# Patient Record
Sex: MALE | Race: WHITE | NOT HISPANIC OR LATINO | URBAN - METROPOLITAN AREA
[De-identification: names, ages, dates, MRNs, and addresses within clinical notes are randomized per-mention and may not be internally consistent; named-entity substitution may affect disease eponyms.]

---

## 2018-08-28 ENCOUNTER — OFFICE VISIT CONVERTED (OUTPATIENT)
Dept: ORTHOPEDIC SURGERY | Facility: CLINIC | Age: 83
End: 2018-08-28
Attending: ORTHOPAEDIC SURGERY

## 2018-11-29 ENCOUNTER — OFFICE VISIT CONVERTED (OUTPATIENT)
Dept: ORTHOPEDIC SURGERY | Facility: CLINIC | Age: 83
End: 2018-11-29
Attending: ORTHOPAEDIC SURGERY

## 2018-12-06 ENCOUNTER — OFFICE VISIT CONVERTED (OUTPATIENT)
Dept: ORTHOPEDIC SURGERY | Facility: CLINIC | Age: 83
End: 2018-12-06
Attending: ORTHOPAEDIC SURGERY

## 2019-02-22 ENCOUNTER — HOSPITAL ENCOUNTER (OUTPATIENT)
Dept: SLEEP MEDICINE | Facility: HOSPITAL | Age: 84
Discharge: HOME | End: 2019-02-22

## 2019-02-25 ENCOUNTER — HOSPITAL ENCOUNTER (OUTPATIENT)
Dept: SLEEP MEDICINE | Facility: HOSPITAL | Age: 84
Discharge: HOME | End: 2019-02-25

## 2019-05-16 ENCOUNTER — HOSPITAL ENCOUNTER (OUTPATIENT)
Dept: SLEEP MEDICINE | Facility: HOSPITAL | Age: 84
Discharge: HOME | End: 2019-05-16
Attending: INTERNAL MEDICINE

## 2019-09-05 ENCOUNTER — HOSPITAL ENCOUNTER (OUTPATIENT)
Dept: SLEEP MEDICINE | Facility: HOSPITAL | Age: 84
Discharge: HOME | End: 2019-09-05
Attending: INTERNAL MEDICINE

## 2019-10-15 ENCOUNTER — HOSPITAL ENCOUNTER (OUTPATIENT)
Dept: SLEEP MEDICINE | Facility: HOSPITAL | Age: 84
Discharge: HOME | End: 2019-10-15
Attending: INTERNAL MEDICINE

## 2019-11-13 ENCOUNTER — HOSPITAL ENCOUNTER (OUTPATIENT)
Dept: GENERAL RADIOLOGY | Facility: HOSPITAL | Age: 84
Discharge: HOME | End: 2019-11-13

## 2019-12-30 ENCOUNTER — OFFICE VISIT CONVERTED (OUTPATIENT)
Dept: NEUROSURGERY | Facility: CLINIC | Age: 84
End: 2019-12-30
Attending: PHYSICIAN ASSISTANT

## 2020-01-09 ENCOUNTER — HOSPITAL ENCOUNTER (OUTPATIENT)
Dept: SLEEP MEDICINE | Facility: HOSPITAL | Age: 85
Discharge: HOME | End: 2020-01-09
Attending: INTERNAL MEDICINE

## 2020-02-07 ENCOUNTER — OFFICE VISIT CONVERTED (OUTPATIENT)
Dept: ORTHOPEDIC SURGERY | Facility: CLINIC | Age: 85
End: 2020-02-07
Attending: ORTHOPAEDIC SURGERY

## 2020-02-17 ENCOUNTER — CONVERSION ENCOUNTER (OUTPATIENT)
Dept: FAMILY MEDICINE CLINIC | Facility: CLINIC | Age: 85
End: 2020-02-17

## 2020-02-17 ENCOUNTER — OFFICE VISIT CONVERTED (OUTPATIENT)
Dept: FAMILY MEDICINE CLINIC | Facility: CLINIC | Age: 85
End: 2020-02-17
Attending: NURSE PRACTITIONER

## 2020-03-05 ENCOUNTER — HOSPITAL ENCOUNTER (OUTPATIENT)
Dept: LAB | Facility: HOSPITAL | Age: 85
Discharge: HOME | End: 2020-03-05
Attending: NURSE PRACTITIONER

## 2020-03-05 LAB
ALBUMIN SERPL-MCNC: 4 G/DL (ref 3.5–5)
ALBUMIN/GLOB SERPL: 1.6 {RATIO} (ref 1.4–2.6)
ALP SERPL-CCNC: 45 U/L (ref 56–155)
ALT SERPL-CCNC: 16 U/L (ref 10–40)
ANION GAP SERPL CALC-SCNC: 17 MMOL/L (ref 8–19)
AST SERPL-CCNC: 23 U/L (ref 15–50)
BILIRUB SERPL-MCNC: 1.27 MG/DL (ref 0.2–1.3)
BUN SERPL-MCNC: 18 MG/DL (ref 5–25)
BUN/CREAT SERPL: 14 {RATIO} (ref 6–20)
CALCIUM SERPL-MCNC: 8.8 MG/DL (ref 8.7–10.4)
CHLORIDE SERPL-SCNC: 103 MMOL/L (ref 99–111)
CHOLEST SERPL-MCNC: 151 MG/DL (ref 107–200)
CHOLEST/HDLC SERPL: 3 {RATIO} (ref 3–6)
CONV CO2: 24 MMOL/L (ref 22–32)
CONV TOTAL PROTEIN: 6.5 G/DL (ref 6.3–8.2)
CREAT UR-MCNC: 1.28 MG/DL (ref 0.7–1.2)
GFR SERPLBLD BASED ON 1.73 SQ M-ARVRAT: 50 ML/MIN/{1.73_M2}
GLOBULIN UR ELPH-MCNC: 2.5 G/DL (ref 2–3.5)
GLUCOSE SERPL-MCNC: 108 MG/DL (ref 70–99)
HDLC SERPL-MCNC: 51 MG/DL (ref 40–60)
LDLC SERPL CALC-MCNC: 86 MG/DL (ref 70–100)
OSMOLALITY SERPL CALC.SUM OF ELEC: 292 MOSM/KG (ref 273–304)
POTASSIUM SERPL-SCNC: 4.4 MMOL/L (ref 3.5–5.3)
PSA SERPL-MCNC: 2.3 NG/ML (ref 0–4)
SODIUM SERPL-SCNC: 140 MMOL/L (ref 135–147)
TRIGL SERPL-MCNC: 70 MG/DL (ref 40–150)
VLDLC SERPL-MCNC: 14 MG/DL (ref 5–37)

## 2020-03-10 ENCOUNTER — OFFICE VISIT CONVERTED (OUTPATIENT)
Dept: NEUROLOGY | Facility: CLINIC | Age: 85
End: 2020-03-10
Attending: NURSE PRACTITIONER

## 2020-03-10 ENCOUNTER — OFFICE VISIT CONVERTED (OUTPATIENT)
Dept: ORTHOPEDIC SURGERY | Facility: CLINIC | Age: 85
End: 2020-03-10
Attending: PHYSICIAN ASSISTANT

## 2020-04-14 ENCOUNTER — HOSPITAL ENCOUNTER (OUTPATIENT)
Dept: LAB | Facility: HOSPITAL | Age: 85
Discharge: HOME | End: 2020-04-14
Attending: NURSE PRACTITIONER

## 2020-04-14 LAB
EST. AVERAGE GLUCOSE BLD GHB EST-MCNC: 143 MG/DL
HBA1C MFR BLD: 6.6 % (ref 3.5–5.7)

## 2020-04-23 ENCOUNTER — OFFICE VISIT CONVERTED (OUTPATIENT)
Dept: FAMILY MEDICINE CLINIC | Facility: CLINIC | Age: 85
End: 2020-04-23
Attending: NURSE PRACTITIONER

## 2020-04-24 ENCOUNTER — HOSPITAL ENCOUNTER (OUTPATIENT)
Dept: GENERAL RADIOLOGY | Facility: HOSPITAL | Age: 85
Discharge: HOME | End: 2020-04-24
Attending: NURSE PRACTITIONER

## 2020-04-24 ENCOUNTER — HOSPITAL ENCOUNTER (OUTPATIENT)
Dept: OTHER | Facility: HOSPITAL | Age: 85
Discharge: HOME | End: 2020-04-24
Attending: NURSE PRACTITIONER

## 2020-04-24 LAB
BASOPHILS # BLD AUTO: 0.04 10*3/UL (ref 0–0.2)
BASOPHILS NFR BLD AUTO: 0.7 % (ref 0–3)
CONV ABS IMM GRAN: 0.03 10*3/UL (ref 0–0.2)
CONV IMMATURE GRAN: 0.5 % (ref 0–1.8)
DEPRECATED RDW RBC AUTO: 40.9 FL (ref 35.1–43.9)
EOSINOPHIL # BLD AUTO: 0.26 10*3/UL (ref 0–0.7)
EOSINOPHIL # BLD AUTO: 4.5 % (ref 0–7)
ERYTHROCYTE [DISTWIDTH] IN BLOOD BY AUTOMATED COUNT: 12 % (ref 11.6–14.4)
HCT VFR BLD AUTO: 40.1 % (ref 42–52)
HGB BLD-MCNC: 13 G/DL (ref 14–18)
LYMPHOCYTES # BLD AUTO: 1.25 10*3/UL (ref 1–5)
LYMPHOCYTES NFR BLD AUTO: 21.7 % (ref 20–45)
MCH RBC QN AUTO: 29.9 PG (ref 27–31)
MCHC RBC AUTO-ENTMCNC: 32.4 G/DL (ref 33–37)
MCV RBC AUTO: 92.2 FL (ref 80–96)
MONOCYTES # BLD AUTO: 0.64 10*3/UL (ref 0.2–1.2)
MONOCYTES NFR BLD AUTO: 11.1 % (ref 3–10)
NEUTROPHILS # BLD AUTO: 3.53 10*3/UL (ref 2–8)
NEUTROPHILS NFR BLD AUTO: 61.5 % (ref 30–85)
NRBC CBCN: 0 % (ref 0–0.7)
PLATELET # BLD AUTO: 233 10*3/UL (ref 130–400)
PMV BLD AUTO: 8.9 FL (ref 9.4–12.4)
RBC # BLD AUTO: 4.35 10*6/UL (ref 4.7–6.1)
WBC # BLD AUTO: 5.75 10*3/UL (ref 4.8–10.8)

## 2020-04-28 ENCOUNTER — HOSPITAL ENCOUNTER (OUTPATIENT)
Dept: LAB | Facility: HOSPITAL | Age: 85
Discharge: HOME | End: 2020-04-28
Attending: NURSE PRACTITIONER

## 2020-04-28 LAB
FERRITIN SERPL-MCNC: 316 NG/ML (ref 30–300)
IRON SATN MFR SERPL: 30 % (ref 20–55)
IRON SERPL-MCNC: 85 UG/DL (ref 70–180)
TIBC SERPL-MCNC: 283 UG/DL (ref 245–450)
TRANSFERRIN SERPL-MCNC: 198 MG/DL (ref 215–365)

## 2020-04-29 LAB
FOLATE SERPL-MCNC: 17.8 NG/ML (ref 4.8–20)
VIT B12 SERPL-MCNC: 467 PG/ML (ref 211–911)

## 2020-05-01 ENCOUNTER — OFFICE VISIT CONVERTED (OUTPATIENT)
Dept: UROLOGY | Facility: CLINIC | Age: 85
End: 2020-05-01
Attending: UROLOGY

## 2020-07-21 ENCOUNTER — OFFICE VISIT CONVERTED (OUTPATIENT)
Dept: ORTHOPEDIC SURGERY | Facility: CLINIC | Age: 85
End: 2020-07-21
Attending: ORTHOPAEDIC SURGERY

## 2020-08-17 ENCOUNTER — OFFICE VISIT CONVERTED (OUTPATIENT)
Dept: FAMILY MEDICINE CLINIC | Facility: CLINIC | Age: 85
End: 2020-08-17
Attending: NURSE PRACTITIONER

## 2020-11-17 ENCOUNTER — OFFICE VISIT CONVERTED (OUTPATIENT)
Dept: FAMILY MEDICINE CLINIC | Facility: CLINIC | Age: 85
End: 2020-11-17
Attending: NURSE PRACTITIONER

## 2020-11-19 ENCOUNTER — HOSPITAL ENCOUNTER (OUTPATIENT)
Dept: LAB | Facility: HOSPITAL | Age: 85
Discharge: HOME | End: 2020-11-19
Attending: NURSE PRACTITIONER

## 2020-11-19 LAB
ALBUMIN SERPL-MCNC: 3.9 G/DL (ref 3.5–5)
ALBUMIN/GLOB SERPL: 1.3 {RATIO} (ref 1.4–2.6)
ALP SERPL-CCNC: 52 U/L (ref 56–155)
ALT SERPL-CCNC: 12 U/L (ref 10–40)
ANION GAP SERPL CALC-SCNC: 16 MMOL/L (ref 8–19)
AST SERPL-CCNC: 23 U/L (ref 15–50)
BASOPHILS # BLD AUTO: 0.03 10*3/UL (ref 0–0.2)
BASOPHILS NFR BLD AUTO: 0.6 % (ref 0–3)
BILIRUB SERPL-MCNC: 1.8 MG/DL (ref 0.2–1.3)
BUN SERPL-MCNC: 19 MG/DL (ref 5–25)
BUN/CREAT SERPL: 13 {RATIO} (ref 6–20)
CALCIUM SERPL-MCNC: 9.4 MG/DL (ref 8.7–10.4)
CHLORIDE SERPL-SCNC: 103 MMOL/L (ref 99–111)
CHOLEST SERPL-MCNC: 149 MG/DL (ref 107–200)
CHOLEST/HDLC SERPL: 3.5 {RATIO} (ref 3–6)
CONV ABS IMM GRAN: 0.03 10*3/UL (ref 0–0.2)
CONV CO2: 25 MMOL/L (ref 22–32)
CONV IMMATURE GRAN: 0.6 % (ref 0–1.8)
CONV TOTAL PROTEIN: 6.9 G/DL (ref 6.3–8.2)
CREAT UR-MCNC: 1.42 MG/DL (ref 0.7–1.2)
DEPRECATED RDW RBC AUTO: 39.8 FL (ref 35.1–43.9)
EOSINOPHIL # BLD AUTO: 0.24 10*3/UL (ref 0–0.7)
EOSINOPHIL # BLD AUTO: 4.5 % (ref 0–7)
ERYTHROCYTE [DISTWIDTH] IN BLOOD BY AUTOMATED COUNT: 11.9 % (ref 11.6–14.4)
GFR SERPLBLD BASED ON 1.73 SQ M-ARVRAT: 44 ML/MIN/{1.73_M2}
GLOBULIN UR ELPH-MCNC: 3 G/DL (ref 2–3.5)
GLUCOSE SERPL-MCNC: 109 MG/DL (ref 70–99)
HCT VFR BLD AUTO: 42.2 % (ref 42–52)
HDLC SERPL-MCNC: 42 MG/DL (ref 40–60)
HGB BLD-MCNC: 13.6 G/DL (ref 14–18)
LDLC SERPL CALC-MCNC: 88 MG/DL (ref 70–100)
LYMPHOCYTES # BLD AUTO: 1.31 10*3/UL (ref 1–5)
LYMPHOCYTES NFR BLD AUTO: 24.4 % (ref 20–45)
MCH RBC QN AUTO: 29.4 PG (ref 27–31)
MCHC RBC AUTO-ENTMCNC: 32.2 G/DL (ref 33–37)
MCV RBC AUTO: 91.1 FL (ref 80–96)
MONOCYTES # BLD AUTO: 0.6 10*3/UL (ref 0.2–1.2)
MONOCYTES NFR BLD AUTO: 11.2 % (ref 3–10)
NEUTROPHILS # BLD AUTO: 3.15 10*3/UL (ref 2–8)
NEUTROPHILS NFR BLD AUTO: 58.7 % (ref 30–85)
NRBC CBCN: 0 % (ref 0–0.7)
OSMOLALITY SERPL CALC.SUM OF ELEC: 291 MOSM/KG (ref 273–304)
PLATELET # BLD AUTO: 244 10*3/UL (ref 130–400)
PMV BLD AUTO: 9.2 FL (ref 9.4–12.4)
POTASSIUM SERPL-SCNC: 4.5 MMOL/L (ref 3.5–5.3)
RBC # BLD AUTO: 4.63 10*6/UL (ref 4.7–6.1)
SODIUM SERPL-SCNC: 139 MMOL/L (ref 135–147)
TRIGL SERPL-MCNC: 95 MG/DL (ref 40–150)
TSH SERPL-ACNC: 2.97 M[IU]/L (ref 0.27–4.2)
VLDLC SERPL-MCNC: 19 MG/DL (ref 5–37)
WBC # BLD AUTO: 5.36 10*3/UL (ref 4.8–10.8)

## 2020-11-30 ENCOUNTER — HOSPITAL ENCOUNTER (OUTPATIENT)
Dept: LAB | Facility: HOSPITAL | Age: 85
Discharge: HOME | End: 2020-11-30
Attending: NURSE PRACTITIONER

## 2020-11-30 LAB
EST. AVERAGE GLUCOSE BLD GHB EST-MCNC: 151 MG/DL
HBA1C MFR BLD: 6.9 % (ref 3.5–5.7)

## 2020-12-08 ENCOUNTER — OFFICE VISIT CONVERTED (OUTPATIENT)
Dept: FAMILY MEDICINE CLINIC | Facility: CLINIC | Age: 85
End: 2020-12-08
Attending: NURSE PRACTITIONER

## 2020-12-08 ENCOUNTER — CONVERSION ENCOUNTER (OUTPATIENT)
Dept: FAMILY MEDICINE CLINIC | Facility: CLINIC | Age: 85
End: 2020-12-08

## 2021-02-02 ENCOUNTER — OFFICE VISIT CONVERTED (OUTPATIENT)
Dept: ORTHOPEDIC SURGERY | Facility: CLINIC | Age: 86
End: 2021-02-02
Attending: PHYSICIAN ASSISTANT

## 2021-02-22 ENCOUNTER — HOSPITAL ENCOUNTER (OUTPATIENT)
Dept: VACCINE CLINIC | Facility: HOSPITAL | Age: 86
Discharge: HOME | End: 2021-02-22
Attending: INTERNAL MEDICINE

## 2021-03-25 ENCOUNTER — HOSPITAL ENCOUNTER (OUTPATIENT)
Dept: VACCINE CLINIC | Facility: HOSPITAL | Age: 86
Discharge: HOME | End: 2021-03-25
Attending: INTERNAL MEDICINE

## 2021-04-08 ENCOUNTER — HOSPITAL ENCOUNTER (OUTPATIENT)
Dept: SLEEP MEDICINE | Facility: HOSPITAL | Age: 86
Discharge: HOME | End: 2021-04-08
Attending: INTERNAL MEDICINE

## 2021-05-10 NOTE — H&P
"   History and Physical      Patient Name: Trav Woods   Patient ID: [id number]   Sex: Male   YOB: 1932    Primary Care Provider: Tessa ALLRED   Referring Provider: Tessa ALLRED    Visit Date: May 1, 2020    Provider: Stanley Vivas MD   Location[facility]   Location Address: [address] [place] [address]  [id number]   Location Phone: [phone]          Chief Complaint  · pt here for urologic issues      History Of Present Illness     89 yo WM with recent epididymitis.    2 weeks ago started have some swelling and redness.  Tenderness.  Patient is currently back to normal.  No pain.  No swelling.    Patient did see his PCP, no antibiotics    4/20 UAnegative    4/20 testicular ultrasound1.4 cm right epididymal head cyst.  Larger 3 cm cyst in the region of the right epididymis distal body    Voiding without issue. No  Prostate meds    Patient has had no gross hematuria    No urologic family history,   Has never had any urologic surgery.    No cardiopulmonary history.  Patient does not smoke.  Patient does not use blood thinner.               Review of Systems  · Constitutional  o Denies  o : chills  · Respiratory  o Admits  o : cough  · Gastrointestinal  o Denies  o : nausea      Vitals  Date Time BP Position Site L\R Cuff Size HR RR TEMP (F) WT  HT  BMI kg/m2 BSA m2 O2 Sat        05/01/2020 12:30 PM       18  165lbs 0oz 5'  10\" 23.67 1.92           Physical Examination  · Constitutional  o Appearance  o : Well-appearing, well-developed, in no acute distress  · Neck  o Inspection/Palpation  o : Normal appearance, no masses or tenderness, trachea midline  · Respiratory  o Respiratory Effort  o : Unlabored breathing  o Auscultation of Lungs  o : Unlabored breathing, clear to auscultation bilaterally  · Cardiovascular  o Heart  o :   § Auscultation of Heart  § : Regular rate and rhythm, no murmurs  · Gastrointestinal  o Abdominal Examination  o : Nontender, nondistended, no " rigidity or guarding, no hepatosplenomegaly  · Genitourinary  o Bladder  o : nonpalpable  o Penis  o : circumcised phallus with no masses or lesions  o Urethral Meatus  o : no inflammation present and no stenosis  o Scrotum and Scrotal Contents  o :   § Testes  § : Bilateral descended testicles no masses or lesions  · Neurologic  o Mental Status Examination  o :   § Orientation  § : Grossly oriented to person, place and time, judgment and insight intact, normal mood and affect       No scrotal pain.               Assessment  · Epididymal cyst     608.89/N50.3    Problems Reconciled  Plan  · Medications  o Medications have been Reconciled  o Transition of Care or Provider Policy  · Instructions  o Electronically Identified Patient Education Materials Provided Electronically       Records reviewed today and summarized in chart    Patient with what sounds to have been epididymitis.  He is back to normal not having any problems and his ultrasound just shows normal variant.  Patient given reassurance    Follow-up PRN    Greater than 20 minutes was used in counseling and coordination of care, with greater than 51% of this in face-to-face counseling             Electronically Signed by: Stanley Vivas MD -Author on May 1, 2020 12:54:31 PM

## 2021-05-10 NOTE — H&P
History and Physical      Patient Name: Trav Woods   Patient ID: [id number]   Sex: Male   YOB: 1932    Primary Care Provider: Tessa ALLRED   Referring Provider: Tessa ALLRED    Visit Date: February 2, 2021    Provider: Melissa Schmidt PA-C   Location: [facility]   Location Address: [address] [place] [address]  [id number]   Location Phone: [phone]          Chief Complaint  · Left lower leg pain      History Of Present Illness  Trav Woods is a 88 year old /White male who presents today to [facility].      Patient is here for left calf pain that started 1 week ago after working out getting in his vehicle feeling a sharp pain in the left calf. Patient has had a Doppler U/S and x ray both negative. Patient states applying heat to the calf that provides relief of pain. Patient states pain is subsiding and is able to walk without assistive device.       Past Medical History  Arthritis; Asthma; BPH; BPH with Urinary Obstruction; Diabetes mellitus, insulin dependent (IDDM), controlled; Elevated Prostate Specific Antigen (PSA); High cholesterol; Hyperlipemia; Hyperlipidemia; Left Hip Trochanteric Bursitis; Muscle cramps; Prostate Disorder; Seasonal allergies; Shortness of Breath; Skin cancer; Trochanteric bursitis of left hip         Past Surgical History  Hand surgery; Hernia Repair; Joint Surgery; Knee surgery; Prostate Biopsy; Skin Cancer Excision; Tonsillectomy; Wrist         Medication List  cetirizine 10 mg oral tablet; ferrous sulfate 325 mg (65 mg iron) oral tablet; Flovent  mcg/actuation inhalation HFA aerosol inhaler; montelukast 10 mg oral tablet; multivitamin oral tablet; simvastatin 20 mg oral tablet         Allergy List  Flomax         Family Medical History  Stroke; Heart Disease; Cancer, Unspecified; Diabetes, unspecified type; *Non Contributory; Blood Diseases; Family history of certain chronic disabling diseases; arthritis;  "Osteoporosis; Family history of Arthritis; Family history of stroke; Family history of heart disease         Social History  Alcohol (Never); lives with spouse; ; Recreational Drug Use (Never); Retired; Tobacco (Never)         Review of Systems  · Constitutional  o Denies  o : fever, chills, weight loss  · Cardiovascular  o Denies  o : chest pain, shortness of breath  · Gastrointestinal  o Denies  o : liver disease, heartburn, nausea, blood in stools  · Genitourinary  o Denies  o : painful urination, blood in urine  · Integument  o Denies  o : rash, itching  · Neurologic  o Denies  o : headache, weakness, loss of consciousness  · Musculoskeletal  o Denies  o : painful, swollen joints  · Psychiatric  o Denies  o : drug/alcohol addiction, anxiety, depression      Vitals  Date Time BP Position Site L\R Cuff Size HR RR TEMP (F) WT  HT  BMI kg/m2 BSA m2 O2 Sat FR L/min FiO2 HC       02/02/2021 08:27 AM      75 - R   159lbs 0oz 5'  10\" 22.81 1.89 99 %            Physical Examination  · Constitutional  o Appearance  o : well developed, well-nourished, no obvious deformities present  · Head and Face  o Head  o :   § Inspection  § : normocephalic  o Face  o :   § Inspection  § : no facial lesions  · Eyes  o Conjunctivae  o : conjunctivae normal  o Sclerae  o : sclerae white  · Ears, Nose, Mouth and Throat  o Ears  o :   § External Ears  § : appearance within normal limits  § Hearing  § : intact  o Nose  o :   § External Nose  § : appearance normal  · Neck  o Inspection/Palpation  o : normal appearance  o Range of Motion  o : full range of motion  · Respiratory  o Respiratory Effort  o : breathing unlabored  o Inspection of Chest  o : normal appearance  o Auscultation of Lungs  o : no audible wheezing or rales  · Cardiovascular  o Heart  o : regular rate  · Gastrointestinal  o Abdominal Examination  o : soft and non-tender  · Skin and Subcutaneous Tissue  o General Inspection  o : intact, no " rashes  · Psychiatric  o General  o : Alert and oriented x3  o Judgement and Insight  o : judgment and insight intact  o Mood and Affect  o : mood normal, affect appropriate  · Left Lower Extremity  o Inspection  o : no skin discoloration, palpable tenderness at proximal gastrocnemius. Full range of the knee and ankle. Neurovascular and sensation intact.          Assessment  · Pain: Leg     729.5/M79.606  · Gastrocnemius strain     844.8/S86.119A      Plan  · Medications  o Medications have been Reconciled  o Transition of Care or Provider Policy  · Instructions  o Reviewed the patient's Past Medical, Social, and Family history as well as the ROS at today's visit, no changes.  o Call or return if worsening symptoms.  o Exercise handout given.  o Reviewed Xrays.  o Follow Up PRN.  o Electronically Identified Patient Education Materials Provided Electronically     educated Patient on stretching   May discontinue walker as tolerated             Electronically Signed by: Melissa Schmidt PA-C -Author on February 2, 2021 08:52:14 AM  Electronically Co-signed by: Cameron Green MD -Reviewer on February 3, 2021 06:56:58 AM

## 2021-05-12 NOTE — PROGRESS NOTES
Progress Note      Patient Name: Trav Woods   Patient ID: [id number]   Sex: Male   YOB: 1932    Primary Care Provider: Tessa ALLRED   Referring Provider: Blanca Cuellar PA-C    Visit Date: April 23, 2020    Provider: BRIGIDA Biggs   Location: [facility]   Location Address: [address] [place] [address], [address]  [place], [place]  [id number]   Location Phone: [phone]          Chief Complaint  · Pt here with c/o swollen genital       History Of Present Illness  Trav Woods is a 88 year old /White male who presents for evaluation and treatment of:      swollen scrotum x1 week.     Pt states that noticed last week that his scrotal sac was red and hanging a little lower than normal, it was not painful. Pt states he was mowing the lawn monday or tuesday, it was red and it was fine for him to walk, but when it came to sitting down or crossing his leg the pressure became worse.   He says the right side was red, but color has improved as the week has gone on. He denies any trouble urinating. Denies any dysuria. He does report some urgency, but not unusal for him.            Past Medical History  Disease Name Date Onset Notes   Arthritis --  --    Asthma --  --    BPH 09/05/2014 --    BPH with urinary obstruction --  --    Diabetes mellitus, insulin dependent (IDDM), controlled --  --    Elevated prostate specific antigen (PSA) --  --    High cholesterol --  --    Hyperlipemia --  --    Hyperlipidemia --  --    Left Hip Trochanteric Bursitis 08/28/2017 --    Muscle cramps --  --    Prostate Disorder --  --    Seasonal allergies --  --    Shortness of Breath --  --    Trochanteric bursitis of left hip 09/21/2017 --          Past Surgical History  Procedure Name Date Notes   Hand surgery --  --    Hernia Repair --  x3   Joint Surgery --  --    Knee surgery --  bilateral knee   Prostate Biopsy 1993 --    Tonsillectomy --  --    Wrist --  --          Medication List  Name Date Started  Instructions   cetirizine 10 mg oral tablet  take 1 tablet (10 mg) by oral route once daily   Flovent  mcg/actuation inhalation HFA aerosol inhaler  inhale 2 puffs (220 mcg) by inhalation route 2 times per day   montelukast 10 mg oral tablet  take 1 tablet (10 mg) by oral route once daily in the evening   multivitamin oral tablet  --    simvastatin 20 mg oral tablet 02/17/2020 take 1 tablet (20 mg) by oral route once daily in the evening for 90 days         Allergy List  Allergen Name Date Reaction Notes   Flomax --  Light-headed --          Family Medical History  Disease Name Relative/Age Notes   Stroke Father/   Father  Father; Brother   Heart Disease Daughter/  Mother/  Son/   Mother; Daughter; Son   Cancer, Unspecified Daughter/  Son/   Daughter; Son   Diabetes, unspecified type Daughter/  Son/   Daughter; Son   *Non Contributory  --    Blood Diseases Daughter/  Son/   Daughter; Son   Family history of certain chronic disabling diseases; arthritis Brother/  Daughter/  Father/  Sister/  Son/   Father; Sister; Brother; Daughter; Son  Sister   Osteoporosis Father/   Father  Daughter; Son   Family history of Arthritis Father/   Father  Father; Sister  Father   Family history of stroke Father/   Father   Family history of heart disease Father/   Father         Social History  Finding Status Start/Stop Quantity Notes   Alcohol Never --/-- --  does not drink  does not drink, has been drinking for 3 years   lives with spouse --  --/-- --  --     --  --/-- --  --    Recreational Drug Use Never --/-- --  no   Retired --  --/-- --  --    Tobacco Never --/-- --  never smoker  never smoker  2 packs per day, smoked 3 years  3 packs per day, smoked 4 years  smoked 1 year         Review of Systems  · Constitutional  o Denies  o : fever, fatigue, weight loss, weight gain  · Cardiovascular  o Denies  o : lower extremity edema, claudication, chest pressure, palpitations  · Respiratory  o Denies  o :  "shortness of breath, wheezing, cough, hemoptysis, dyspnea on exertion  · Gastrointestinal  o Denies  o : nausea, vomiting, diarrhea, constipation, abdominal pain  · Genitourinary  o Admits  o : frequency, scrotal pain      Vitals  Date Time BP Position Site L\R Cuff Size HR RR TEMP (F) WT  HT  BMI kg/m2 BSA m2 O2 Sat HC       04/23/2020 03:10 /64 Sitting    76 - R   170lbs 0oz 5'  10\" 24.39 1.95 100 %          Physical Examination  · Constitutional  o Appearance  o : well developed, well-nourished, no acute distress  · Head and Face  o Head  o : normocephalic, atraumatic  · Neck  o Inspection/Palpation  o : normal appearance, no masses or tenderness, trachea midline  o Thyroid  o : gland size normal, nontender, no nodules or masses present on palpation  · Respiratory  o Respiratory Effort  o : breathing unlabored  o Inspection of Chest  o : chest rise symmetric bilaterally  o Auscultation of Lungs  o : clear to auscultation bilaterally throughout inspiration and expiration  · Cardiovascular  o Heart  o :   § Auscultation of Heart  § : regular rate and rhythm, no murmurs, gallops or rubs  o Peripheral Vascular System  o :   § Extremities  § : no edema  · Genitourinary  o Penis  o : normal general appearance, no lesions present, no discharge, foreskin normal, normal development for age  o Scrotum and Scrotal Contents  o :   § Scrotum  § : right side scrotum erythematous, swollen, TTP,   § Testes  § : right testicle hard with associated edema surrounding, left testicle non palpable  · Lymphatic  o Neck  o : no cervical lymphadenopathy, no supraclavicular lymphadenopathy  · Psychiatric  o Mood and Affect  o : mood normal, affect appropriate          Results  · In-Office Procedures  o Lab procedure  § IOP - Urinalysis without Microscopy (Clinitek) [facility] ([id number])   § Color Ur: Yellow   § Clarity Ur: Clear   § Glucose Ur Ql Strip: Negative   § Bilirub Ur Ql Strip: Negative   § Ketones Ur Ql Strip: Negative   § Sp Gr Ur " Qn: 1.020   § Hgb Ur Ql Strip: Negative   § pH Ur-LsCnc: 5.5   § Prot Ur Ql Strip: Negative   § Urobilinogen Ur Strip-mCnc: 0.2 E.U./dL   § Nitrite Ur Ql Strip: Negative   § WBC Est Ur Ql Strip: Negative       Assessment  · Testicular pain, right     608.9/N50.811  · Swollen testicle     608.86/N50.89    Problems Reconciled  Plan  · Orders  o CBC with [facility] ([id number]) - 608.9/N50.811, 608.86/N50.89 - 04/23/2020  o ACO-39: Current medications updated and reviewed () - - 04/23/2020  o Testicular ultrasound ([id number]) - 608.9/N50.811, 608.86/N50.89 - 04/23/2020  · Medications  o Medications have been Reconciled  o Transition of Care or Provider Policy  · Instructions  o Patient is taking medications as prescribed and doing well.   o Patient was educated/instructed on their diagnosis, treatment and medications prior to discharge from the clinic today.  · Disposition  o Follow Up PRN            Electronically Signed by: BRIGIDA Biggs -Author on April 28, 2020 10:39:50 AM
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
## 2021-05-13 NOTE — PROGRESS NOTES
Progress Note      Patient Name: Trav Woods   Patient ID: [id number]   Sex: Male   YOB: 1932    Primary Care Provider: Tessa ALLRED   Referring Provider: Tessa ALLRED    Visit Date: August 17, 2020    Provider: BRIGIDA Biggs   Location: [facility]   Location Address: [address] [place] [address], [address]  [place], [place]  [id number]   Location Phone: [phone]          Chief Complaint  · Medication refill       History Of Present Illness  Trav Woods is a 88 year old /White male who presents for evaluation and treatment of:      hyperlipidemia  Patient is here today for a follow up on his medication.     Patient states that he has one 3 month supply on his simvastatin left.     Hyperlipedmia: Simvastatin 20mg. Patient is tolerating the medication well and has no complaints of leg cramps, or muscle weakness.     Patient has no other complaints.       Past Medical History  Disease Name Date Onset Notes   Arthritis --  --    Asthma --  --    BPH 09/05/2014 --    BPH with Urinary Obstruction --  --    Diabetes mellitus, insulin dependent (IDDM), controlled --  --    Elevated Prostate Specific Antigen (PSA) --  --    High cholesterol --  --    Hyperlipemia --  --    Hyperlipidemia --  --    Left Hip Trochanteric Bursitis 08/28/2017 --    Muscle cramps --  --    Prostate Disorder --  --    Seasonal allergies --  --    Shortness of Breath --  --    Skin cancer --  --    Trochanteric bursitis of left hip 09/21/2017 --          Past Surgical History  Procedure Name Date Notes   Hand surgery --  --    Hernia Repair --  x3   Joint Surgery --  --    Knee surgery --  bilateral knee   Prostate Biopsy 1993 --    Skin Cancer Excision --  --    Tonsillectomy --  --    Wrist --  --          Medication List  Name Date Started Instructions   cetirizine 10 mg oral tablet  take 1 tablet (10 mg) by oral route once daily   Flovent  mcg/actuation inhalation HFA aerosol inhaler  inhale 2  puffs (220 mcg) by inhalation route 2 times per day   montelukast 10 mg oral tablet  take 1 tablet (10 mg) by oral route once daily in the evening   multivitamin oral tablet  --    simvastatin 20 mg oral tablet 02/17/2020 take 1 tablet (20 mg) by oral route once daily in the evening for 90 days         Allergy List  Allergen Name Date Reaction Notes   Flomax --  Light-headed --        Allergies Reconciled  Family Medical History  Disease Name Relative/Age Notes   Stroke Father/   Father  Father; Brother   Heart Disease Daughter/  Mother/  Son/   Mother; Daughter; Son   Cancer, Unspecified Daughter/  Son/   Daughter; Son   Diabetes, unspecified type Daughter/  Son/   Daughter; Son   *Non Contributory  --    Blood Diseases Daughter/  Son/   Daughter; Son   Family history of certain chronic disabling diseases; arthritis Brother/  Daughter/  Father/  Sister/  Son/   Father; Sister; Brother; Daughter; Son  Sister   Osteoporosis Father/   Father  Daughter; Son   Family history of Arthritis Father/   Father  Father; Sister  Father   Family history of stroke Father/   Father   Family history of heart disease Father/   Father         Social History  Finding Status Start/Stop Quantity Notes   Alcohol Never --/-- --  does not drink  does not drink, has been drinking for 3 years   lives with spouse --  --/-- --  --     --  --/-- --  --    Recreational Drug Use Never --/-- --  no   Retired --  --/-- --  --    Tobacco Never --/-- --  never smoker  never smoker  2 packs per day, smoked 3 years  3 packs per day, smoked 4 years  smoked 1 year         Review of Systems  · Constitutional  o Denies  o : fatigue  · Eyes  o Denies  o : blurred vision, changes in vision  · HENT  o Denies  o : headaches  · Cardiovascular  o Denies  o : chest pain, irregular heart beats, rapid heart rate, dyspnea on exertion  · Respiratory  o Denies  o : shortness of breath, wheezing, cough  · Gastrointestinal  o Denies  o : nausea,  "vomiting, diarrhea, constipation, abdominal pain, blood in stools, melena  · Genitourinary  o Denies  o : frequency, dysuria, hematuria  · Integument  o Denies  o : rash, new skin lesions  · Musculoskeletal  o Denies  o : joint pain, joint swelling, muscle pain  · Endocrine  o Denies  o : polyuria, polydipsia      Vitals  Date Time BP Position Site L\R Cuff Size HR RR TEMP (F) WT  HT  BMI kg/m2 BSA m2 O2 Sat HC       08/17/2020 11:24 /54 Sitting    70 - R  98.2 162lbs 0oz 5'  10\" 23.24 1.91 98 %          Physical Examination  · Constitutional  o Appearance  o : well developed, well-nourished, no acute distress  · Head and Face  o Head  o : normocephalic, atraumatic  · Respiratory  o Respiratory Effort  o : breathing unlabored  o Inspection of Chest  o : chest rise symmetric bilaterally  o Auscultation of Lungs  o : clear to auscultation bilaterally throughout inspiration and expiration  · Cardiovascular  o Heart  o :   § Auscultation of Heart  § : regular rate and rhythm, no murmurs, gallops or rubs  o Peripheral Vascular System  o :   § Extremities  § : no edema  · Psychiatric  o Mood and Affect  o : mood normal, affect appropriate              Assessment  · Hyperlipidemia     272.4/E78.5  cont medication, will recheck lab in 3 months with OV    Problems Reconciled  Plan  · Orders  o ACO-39: Current medications updated and reviewed () - - 08/17/2020  · Medications  o Medications have been Reconciled  o Transition of Care or Provider Policy  · Instructions  o Patient was educated and given low cholesterol diet information.  o Recommended exercise program to assist with cholesterol, weight loss and overall health improvement.  o Advised that cheeses and other sources of dairy fats, animal fats, fast food, and the extras (candy, pastries, pies, doughnuts and cookies) all contain LDL raising nutrients. Advised to increase fruits, vegetables, whole grains, and to monitor portion sizes.   o Patient is taking " medications as prescribed and doing well.   o Patient was educated/instructed on their diagnosis, treatment and medications prior to discharge from the clinic today.  o Call the office with any concerns or questions.  o Electronically Identified Patient Education Materials Provided Electronically  · Disposition  o Follow Up in 3 months            Electronically Signed by: BRIGIDA Biggs -Author on August 17, 2020 07:58:54 PM

## 2021-05-13 NOTE — PROGRESS NOTES
Progress Note      Patient Name: Trav Woods   Patient ID: [id number]   Sex: Male   YOB: 1932    Primary Care Provider: Tessa ALLRED   Referring Provider: Tessa ALLRED    Visit Date: November 17, 2020    Provider: BRIGIDA Biggs   Location: [facility]   Location Address: [address] [place] [address], [address]  [place], [place]  [id number]   Location Phone: [phone]          Chief Complaint  · med refill on Simvastatin  · allergic rhinitis      History Of Present Illness  Trav Woods is a 88 year old /White male who presents for evaluation and treatment of:      Patient is here today needing his medication refilled. Patient is doing well and has no complaints.    HLD: Simvastatin nightly and doing well, denies any leg cramps, or myalgias.    Allergic rhinitis-taking cetirizine, Flovent, and Montelukast daily with good results. symptoms are well controlled.     pt is due for labwork today.       Past Medical History  Disease Name Date Onset Notes   Arthritis --  --    Asthma --  --    BPH 09/05/2014 --    BPH with Urinary Obstruction --  --    Diabetes mellitus, insulin dependent (IDDM), controlled --  --    Elevated prostate specific antigen (PSA) --  --    High cholesterol --  --    Hyperlipemia --  --    Hyperlipidemia --  --    Left Hip Trochanteric Bursitis 08/28/2017 --    Muscle cramps --  --    Prostate Disorder --  --    Seasonal allergies --  --    Shortness of Breath --  --    Skin cancer --  --    Trochanteric bursitis of left hip 09/21/2017 --          Past Surgical History  Procedure Name Date Notes   Hand surgery --  --    Hernia Repair --  x3   Joint Surgery --  --    Knee surgery --  bilateral knee   Prostate Biopsy 1993 --    Skin Cancer Excision --  --    Tonsillectomy --  --    Wrist --  --          Medication List  Name Date Started Instructions   cetirizine 10 mg oral tablet  take 1 tablet (10 mg) by oral route once daily   Flovent HFA  110 mcg/actuation inhalation HFA aerosol inhaler  inhale 2 puffs (220 mcg) by inhalation route 2 times per day   montelukast 10 mg oral tablet  take 1 tablet (10 mg) by oral route once daily in the evening   multivitamin oral tablet  --    simvastatin 20 mg oral tablet 11/17/2020 take 1 tablet (20 mg) by oral route once daily in the evening for 90 days         Allergy List  Allergen Name Date Reaction Notes   Flomax --  Light-headed --        Allergies Reconciled  Family Medical History  Disease Name Relative/Age Notes   Stroke Father/   Father  Father; Brother   Heart Disease Daughter/  Mother/  Son/   Mother; Daughter; Son   Cancer, Unspecified Daughter/  Son/   Daughter; Son   Diabetes, unspecified type Daughter/  Son/   Daughter; Son   *Non Contributory  --    Blood Diseases Daughter/  Son/   Daughter; Son   Family history of certain chronic disabling diseases; arthritis Brother/  Daughter/  Father/  Sister/  Son/   Father; Sister; Brother; Daughter; Son  Sister   Osteoporosis Father/   Father  Daughter; Son   Family history of Arthritis Father/   Father  Father; Sister  Father   Family history of stroke Father/   Father   Family history of heart disease Father/   Father         Social History  Finding Status Start/Stop Quantity Notes   Alcohol Never --/-- --  does not drink  does not drink, has been drinking for 3 years   lives with spouse --  --/-- --  --     --  --/-- --  --    Recreational Drug Use Never --/-- --  no   Retired --  --/-- --  --    Tobacco Never --/-- --  never smoker  never smoker  2 packs per day, smoked 3 years  3 packs per day, smoked 4 years  smoked 1 year         Review of Systems  · Constitutional  o Denies  o : fatigue  · Eyes  o Denies  o : blurred vision, changes in vision  · HENT  o Denies  o : headaches  · Cardiovascular  o Denies  o : chest pain, irregular heart beats, rapid heart rate, dyspnea on exertion  · Respiratory  o Denies  o : shortness of breath,  "wheezing, cough  · Gastrointestinal  o Denies  o : nausea, vomiting, diarrhea, constipation, abdominal pain, blood in stools, melena  · Genitourinary  o Denies  o : frequency, dysuria, hematuria  · Integument  o Denies  o : rash, new skin lesions  · Musculoskeletal  o Denies  o : joint pain, joint swelling, muscle pain  · Endocrine  o Denies  o : polyuria, polydipsia      Vitals  Date Time BP Position Site L\R Cuff Size HR RR TEMP (F) WT  HT  BMI kg/m2 BSA m2 O2 Sat FR L/min FiO2 HC       11/17/2020 11:50 /63 Sitting    74 - R  98.4 165lbs 16oz 5'  10\" 23.82 1.93 99 %  21%          Physical Examination  · Constitutional  o Appearance  o : well developed, well-nourished, no acute distress  · Head and Face  o Head  o : normocephalic, atraumatic  · Respiratory  o Respiratory Effort  o : breathing unlabored  o Inspection of Chest  o : chest rise symmetric bilaterally  o Auscultation of Lungs  o : clear to auscultation bilaterally throughout inspiration and expiration  · Cardiovascular  o Heart  o :   § Auscultation of Heart  § : regular rate and rhythm, no murmurs, gallops or rubs  o Peripheral Vascular System  o :   § Extremities  § : no edema  · Psychiatric  o Mood and Affect  o : mood normal, affect appropriate          Assessment  · Allergic rhinitis due to allergen     477.9/J30.9  · Hyperlipidemia     272.4/E78.5  · High cholesterol     272.0/E78.0  · Routine lab draw     V72.60/Z01.89  · Medication management     V58.69/Z79.899  · Screening for diabetes mellitus     V77.1/Z13.1    Problems Reconciled  Plan  · Orders  o Physical, Primary Care Panel (CBC, CMP, Lipid, TSH) [facility] ([id number], [id number], [id number], [id number]) - 272.0/E78.0 - 11/17/2020  o ACO-39: Current medications updated and reviewed (1159F, ) - - 11/17/2020  · Medications  o simvastatin 20 mg oral tablet   SIG: take 1 tablet (20 mg) by oral route once daily in the evening for 90 days   DISP: (90) Tablet with 1 refills  Refilled on 11/17/2020 "     o Medications have been Reconciled  o Transition of Care or Provider Policy  · Instructions  o Patient was educated and given low cholesterol diet information.  o Recommended exercise program to assist with cholesterol, weight loss and overall health improvement.  o Advised that cheeses and other sources of dairy fats, animal fats, fast food, and the extras (candy, pastries, pies, doughnuts and cookies) all contain LDL raising nutrients. Advised to increase fruits, vegetables, whole grains, and to monitor portion sizes.   o Patient is taking medications as prescribed and doing well.   o Patient was educated/instructed on their diagnosis, treatment and medications prior to discharge from the clinic today.  o Call the office with any concerns or questions.  o Discussed Covid-19 precautions including, but not limited to, social distancing, avoid touching your face, and hand washing.   o Electronically Identified Patient Education Materials Provided Electronically  · Disposition  o Follow Up in 6 months            Electronically Signed by: BRIGIDA Biggs -Author on November 17, 2020 03:26:04 PM

## 2021-05-13 NOTE — PROGRESS NOTES
Progress Note      Patient Name: Trav Woods   Patient ID: [id number]   Sex: Male   YOB: 1932    Primary Care Provider: Tessa ALLRED   Referring Provider: Tessa ALLRED    Visit Date: December 8, 2020    Provider: BRIGIDA Biggs   Location: [facility]   Location Address: [address] [place] [address], [address]  [place], [place]  [id number]   Location Phone: [phone]          Chief Complaint  · New onset DM2      History Of Present Illness  Trav Woods is a 88 year old /White male who presents for evaluation and treatment of:      Patient here to discuss recent labs showing new onset DM2.  Labs on 11/30/20 showed A1c of 6.9% with EAG of 151. Previous HgBA1c was 6.6%.  Patient states his level is only high because he ate too much Halloween candy.       Past Medical History  Disease Name Date Onset Notes   Arthritis --  --    Asthma --  --    BPH 09/05/2014 --    BPH with Urinary Obstruction --  --    Diabetes mellitus, insulin dependent (IDDM), controlled --  --    Elevated Prostate Specific Antigen (PSA) --  --    High cholesterol --  --    Hyperlipemia --  --    Hyperlipidemia --  --    Left Hip Trochanteric Bursitis 08/28/2017 --    Muscle cramps --  --    Prostate Disorder --  --    Seasonal allergies --  --    Shortness of Breath --  --    Skin cancer --  --    Trochanteric bursitis of left hip 09/21/2017 --          Past Surgical History  Procedure Name Date Notes   Hand surgery --  --    Hernia Repair --  x3   Joint Surgery --  --    Knee surgery --  bilateral knee   Prostate Biopsy 1993 --    Skin Cancer Excision --  --    Tonsillectomy --  --    Wrist --  --          Medication List  Name Date Started Instructions   cetirizine 10 mg oral tablet  take 1 tablet (10 mg) by oral route once daily   ferrous sulfate 325 mg (65 mg iron) oral tablet  take 1 tablet (325 mg) by oral route once daily   Flovent  mcg/actuation inhalation HFA aerosol inhaler   inhale 2 puffs (220 mcg) by inhalation route 2 times per day   montelukast 10 mg oral tablet  take 1 tablet (10 mg) by oral route once daily in the evening   multivitamin oral tablet  --    simvastatin 20 mg oral tablet 11/17/2020 take 1 tablet (20 mg) by oral route once daily in the evening for 90 days         Allergy List  Allergen Name Date Reaction Notes   Flomax --  Light-headed --        Allergies Reconciled  Family Medical History  Disease Name Relative/Age Notes   Stroke Father/   Father  Father; Brother   Heart Disease Daughter/  Mother/  Son/   Mother; Daughter; Son   Cancer, Unspecified Daughter/  Son/   Daughter; Son   Diabetes, unspecified type Daughter/  Son/   Daughter; Son   *Non Contributory  --    Blood Diseases Daughter/  Son/   Daughter; Son   Family history of certain chronic disabling diseases; arthritis Brother/  Daughter/  Father/  Sister/  Son/   Father; Sister; Brother; Daughter; Son  Sister   Osteoporosis Father/   Father  Daughter; Son   Family history of Arthritis Father/   Father  Father; Sister  Father   Family history of stroke Father/   Father   Family history of heart disease Father/   Father         Social History  Finding Status Start/Stop Quantity Notes   Alcohol Never --/-- --  does not drink  does not drink, has been drinking for 3 years   lives with spouse --  --/-- --  --     --  --/-- --  --    Recreational Drug Use Never --/-- --  no   Retired --  --/-- --  --    Tobacco Never --/-- --  --          Immunizations  NameDate Admin Mfg Trade Name Lot Number Route Inj VIS Given VIS Publication   [id number]/10/2020 [employer]  NE NE 12/08/2020    Comments:          Review of Systems  · Constitutional  o Denies  o : fatigue  · Eyes  o Denies  o : blurred vision, changes in vision  · HENT  o Denies  o : headaches  · Cardiovascular  o Denies  o : chest pain, irregular heart beats, rapid heart rate, dyspnea on exertion  · Respiratory  o Denies  o :  "shortness of breath, wheezing, cough  · Gastrointestinal  o Denies  o : nausea, vomiting, diarrhea, constipation, abdominal pain, blood in stools, melena  · Genitourinary  o Denies  o : frequency, dysuria, hematuria  · Integument  o Denies  o : rash, new skin lesions  · Musculoskeletal  o Denies  o : joint pain, joint swelling, muscle pain  · Endocrine  o Denies  o : polyuria, polydipsia      Vitals  Date Time BP Position Site L\R Cuff Size HR RR TEMP (F) WT  HT  BMI kg/m2 BSA m2 O2 Sat FR L/min FiO2 HC       08/17/2020 11:24 /54 Sitting    70 - R  98.2 162lbs 0oz 5'  10\" 23.24 1.91 98 %  21%    11/17/2020 11:50 /63 Sitting    74 - R  98.4 165lbs 16oz 5'  10\" 23.82 1.93 99 %  21%    12/08/2020 12:00 /65 Sitting    69 - R  98.1 169lbs 6oz 5'  10\" 24.3 1.95 100 %  21%          Physical Examination  · Constitutional  o Appearance  o : well developed, well-nourished, no acute distress  · Head and Face  o Head  o : normocephalic, atraumatic  · Respiratory  o Respiratory Effort  o : breathing unlabored  o Inspection of Chest  o : chest rise symmetric bilaterally  o Auscultation of Lungs  o : clear to auscultation bilaterally throughout inspiration and expiration  · Cardiovascular  o Heart  o :   § Auscultation of Heart  § : regular rate and rhythm, no murmurs, gallops or rubs  o Peripheral Vascular System  o :   § Extremities  § : no edema  · Psychiatric  o Mood and Affect  o : mood normal, affect appropriate          Assessment  · Diabetes mellitus, type 2     250.00/E11.9  declines medication intervention at OV, wishes to continue diet and exercise. order given for repeat labwork in 3 months.     Problems Reconciled  Plan  · Orders  o CMP [facility] ([id number]) - 250.00/E11.9 - 02/14/2021  o Hgb A1c [facility] ([id number]) - 250.00/E11.9 - 02/14/2021  o ACO-39: Current medications updated and reviewed (1159F, ) - - 12/08/2020  · Medications  o Medications have been Reconciled  o Transition of Care or Provider " Policy  · Instructions  o Patient is taking medications as prescribed and doing well.   o Patient was educated/instructed on their diagnosis, treatment and medications prior to discharge from the clinic today.  o Call the office with any concerns or questions.  o Discussed Covid-19 precautions including, but not limited to, social distancing, avoid touching your face, and hand washing.   o Electronically Identified Patient Education Materials Provided Electronically  · Disposition  o Follow Up PRN            Electronically Signed by: BRIGIDA Biggs -Author on December 8, 2020 12:28:48 PM

## 2021-05-14 VITALS
HEART RATE: 69 BPM | SYSTOLIC BLOOD PRESSURE: 130 MMHG | TEMPERATURE: 98.1 F | OXYGEN SATURATION: 100 % | HEIGHT: 70 IN | DIASTOLIC BLOOD PRESSURE: 65 MMHG | BODY MASS INDEX: 24.25 KG/M2 | WEIGHT: 169.37 LBS

## 2021-05-14 VITALS
HEIGHT: 70 IN | DIASTOLIC BLOOD PRESSURE: 63 MMHG | TEMPERATURE: 98.4 F | SYSTOLIC BLOOD PRESSURE: 111 MMHG | HEART RATE: 74 BPM | WEIGHT: 166 LBS | OXYGEN SATURATION: 99 % | BODY MASS INDEX: 23.77 KG/M2

## 2021-05-14 VITALS — WEIGHT: 159 LBS | OXYGEN SATURATION: 99 % | HEIGHT: 70 IN | BODY MASS INDEX: 22.76 KG/M2 | HEART RATE: 75 BPM

## 2021-05-15 VITALS
OXYGEN SATURATION: 92 % | HEART RATE: 77 BPM | HEIGHT: 70 IN | WEIGHT: 168 LBS | DIASTOLIC BLOOD PRESSURE: 68 MMHG | BODY MASS INDEX: 24.05 KG/M2 | SYSTOLIC BLOOD PRESSURE: 109 MMHG

## 2021-05-15 VITALS
SYSTOLIC BLOOD PRESSURE: 126 MMHG | HEIGHT: 70 IN | DIASTOLIC BLOOD PRESSURE: 64 MMHG | HEART RATE: 76 BPM | WEIGHT: 170 LBS | BODY MASS INDEX: 24.34 KG/M2 | OXYGEN SATURATION: 100 %

## 2021-05-15 VITALS — WEIGHT: 172.25 LBS | BODY MASS INDEX: 25.51 KG/M2 | HEIGHT: 69 IN | OXYGEN SATURATION: 97 % | HEART RATE: 90 BPM

## 2021-05-15 VITALS
BODY MASS INDEX: 23.19 KG/M2 | DIASTOLIC BLOOD PRESSURE: 54 MMHG | SYSTOLIC BLOOD PRESSURE: 113 MMHG | HEART RATE: 70 BPM | OXYGEN SATURATION: 98 % | TEMPERATURE: 98.2 F | WEIGHT: 162 LBS | HEIGHT: 70 IN

## 2021-05-15 VITALS
WEIGHT: 165.19 LBS | HEART RATE: 88 BPM | BODY MASS INDEX: 23.65 KG/M2 | DIASTOLIC BLOOD PRESSURE: 61 MMHG | HEIGHT: 70 IN | SYSTOLIC BLOOD PRESSURE: 131 MMHG

## 2021-05-15 VITALS — RESPIRATION RATE: 18 BRPM | HEIGHT: 70 IN | WEIGHT: 165 LBS | BODY MASS INDEX: 23.62 KG/M2

## 2021-05-15 VITALS — HEIGHT: 70 IN | WEIGHT: 170.25 LBS | BODY MASS INDEX: 24.37 KG/M2 | HEART RATE: 80 BPM | OXYGEN SATURATION: 98 %

## 2021-05-15 VITALS — HEIGHT: 69 IN | WEIGHT: 169 LBS | BODY MASS INDEX: 25.03 KG/M2 | HEART RATE: 77 BPM

## 2021-05-15 VITALS — WEIGHT: 165 LBS | BODY MASS INDEX: 23.62 KG/M2 | HEIGHT: 70 IN

## 2021-05-16 VITALS — OXYGEN SATURATION: 92 % | HEIGHT: 67 IN | BODY MASS INDEX: 26.68 KG/M2 | HEART RATE: 73 BPM | WEIGHT: 170 LBS

## 2021-05-16 VITALS — WEIGHT: 177 LBS | HEART RATE: 71 BPM | OXYGEN SATURATION: 99 %

## 2021-05-16 VITALS — WEIGHT: 169 LBS | BODY MASS INDEX: 25.03 KG/M2 | HEIGHT: 69 IN | OXYGEN SATURATION: 97 % | HEART RATE: 70 BPM

## 2023-09-01 ENCOUNTER — TRANSCRIBE ORDERS (OUTPATIENT)
Dept: ADMINISTRATIVE | Facility: HOSPITAL | Age: 88
End: 2023-09-01

## 2023-09-01 DIAGNOSIS — R01.1 CARDIAC MURMUR: Primary | ICD-10-CM

## 2023-09-22 ENCOUNTER — HOSPITAL ENCOUNTER (OUTPATIENT)
Dept: CARDIOLOGY | Facility: HOSPITAL | Age: 88
Discharge: HOME | End: 2023-09-22
Payer: MEDICARE

## 2023-09-22 DIAGNOSIS — R01.1 CARDIAC MURMUR: ICD-10-CM

## 2023-09-22 LAB
BH CV ECHO MEAS - AO MEAN PG: 7 MMHG
BH CV ECHO MEAS - AO ROOT DIAM: 3.5 CM
BH CV ECHO MEAS - AO V2 VTI: 36.4 CM
BH CV ECHO MEAS - AVA(I,D): 1.26 CM2
BH CV ECHO MEAS - EDV(CUBED): 82.9 ML
BH CV ECHO MEAS - EDV(MOD-SP2): 42.3 ML
BH CV ECHO MEAS - EDV(MOD-SP4): 33 ML
BH CV ECHO MEAS - EF(MOD-BP): 56.3 %
BH CV ECHO MEAS - EF(MOD-SP2): 59.8 %
BH CV ECHO MEAS - EF(MOD-SP4): 47.9 %
BH CV ECHO MEAS - ESV(CUBED): 25.4 ML
BH CV ECHO MEAS - ESV(MOD-SP2): 17 ML
BH CV ECHO MEAS - ESV(MOD-SP4): 17.2 ML
BH CV ECHO MEAS - FS: 32.6 %
BH CV ECHO MEAS - IVS/LVPW: 1.04 CM
BH CV ECHO MEAS - IVSD: 1.14 CM
BH CV ECHO MEAS - LA DIMENSION: 2.8 CM
BH CV ECHO MEAS - LAT PEAK E' VEL: 5.8 CM/SEC
BH CV ECHO MEAS - LV DIASTOLIC VOL/BSA (35-75): 17.6 CM2
BH CV ECHO MEAS - LV MASS(C)D: 170.8 GRAMS
BH CV ECHO MEAS - LV MAX PG: 2.26 MMHG
BH CV ECHO MEAS - LV MEAN PG: 1 MMHG
BH CV ECHO MEAS - LV SYSTOLIC VOL/BSA (12-30): 9.2 CM2
BH CV ECHO MEAS - LV V1 MAX: 75.1 CM/SEC
BH CV ECHO MEAS - LV V1 VTI: 12.1 CM
BH CV ECHO MEAS - LVIDD: 4.4 CM
BH CV ECHO MEAS - LVIDS: 2.9 CM
BH CV ECHO MEAS - LVOT AREA: 3.8 CM2
BH CV ECHO MEAS - LVOT DIAM: 2.2 CM
BH CV ECHO MEAS - LVPWD: 1.1 CM
BH CV ECHO MEAS - MED PEAK E' VEL: 6.6 CM/SEC
BH CV ECHO MEAS - MV A MAX VEL: 75.4 CM/SEC
BH CV ECHO MEAS - MV DEC SLOPE: 317 CM/SEC2
BH CV ECHO MEAS - MV DEC TIME: 0.16 SEC
BH CV ECHO MEAS - MV E MAX VEL: 50.6 CM/SEC
BH CV ECHO MEAS - MV E/A: 0.67
BH CV ECHO MEAS - RAP SYSTOLE: 5 MMHG
BH CV ECHO MEAS - RVDD: 2.45 CM
BH CV ECHO MEAS - RVSP: 25.8 MMHG
BH CV ECHO MEAS - SI(MOD-SP2): 13.5 ML/M2
BH CV ECHO MEAS - SI(MOD-SP4): 8.4 ML/M2
BH CV ECHO MEAS - SV(LVOT): 46 ML
BH CV ECHO MEAS - SV(MOD-SP2): 25.3 ML
BH CV ECHO MEAS - SV(MOD-SP4): 15.8 ML
BH CV ECHO MEAS - TAPSE (>1.6): 1.91 CM
BH CV ECHO MEAS - TR MAX PG: 20.8 MMHG
BH CV ECHO MEAS - TR MAX VEL: 228 CM/SEC
BH CV ECHO MEASUREMENTS AVERAGE E/E' RATIO: 8.16
IVRT: 66 MS
LEFT ATRIUM VOLUME INDEX: 11.3 ML/M2

## 2023-09-22 PROCEDURE — 93306 TTE W/DOPPLER COMPLETE: CPT

## 2024-03-04 ENCOUNTER — APPOINTMENT (OUTPATIENT)
Facility: HOSPITAL | Age: 89
End: 2024-03-04
Payer: MEDICARE

## 2024-03-04 ENCOUNTER — HOSPITAL ENCOUNTER (EMERGENCY)
Facility: HOSPITAL | Age: 89
Discharge: HOME | End: 2024-03-04
Attending: EMERGENCY MEDICINE | Admitting: EMERGENCY MEDICINE
Payer: MEDICARE

## 2024-03-04 VITALS
HEIGHT: 70 IN | TEMPERATURE: 98.3 F | OXYGEN SATURATION: 99 % | DIASTOLIC BLOOD PRESSURE: 71 MMHG | RESPIRATION RATE: 20 BRPM | WEIGHT: 172.18 LBS | BODY MASS INDEX: 24.65 KG/M2 | HEART RATE: 76 BPM | SYSTOLIC BLOOD PRESSURE: 128 MMHG

## 2024-03-04 DIAGNOSIS — I82.422 ACUTE DEEP VEIN THROMBOSIS (DVT) OF ILIAC VEIN OF LEFT LOWER EXTREMITY: Primary | ICD-10-CM

## 2024-03-04 PROBLEM — E78.00 HIGH CHOLESTEROL: Status: ACTIVE | Noted: 2024-03-04

## 2024-03-04 PROBLEM — M76.899 ENTHESOPATHY OF HIP REGION: Status: ACTIVE | Noted: 2017-08-28

## 2024-03-04 PROBLEM — J30.2 SEASONAL ALLERGIC RHINITIS: Status: ACTIVE | Noted: 2024-03-04

## 2024-03-04 PROBLEM — J45.909 ASTHMA: Status: ACTIVE | Noted: 2024-03-04

## 2024-03-04 PROBLEM — R06.02 SHORTNESS OF BREATH: Status: ACTIVE | Noted: 2024-03-04

## 2024-03-04 PROBLEM — E10.9 TYPE 1 DIABETES MELLITUS: Status: ACTIVE | Noted: 2024-03-04

## 2024-03-04 PROBLEM — E78.5 HYPERLIPEMIA: Status: ACTIVE | Noted: 2024-03-04

## 2024-03-04 PROBLEM — M19.90 ARTHRITIS: Status: ACTIVE | Noted: 2024-03-04

## 2024-03-04 PROBLEM — C44.90 SKIN CANCER: Status: ACTIVE | Noted: 2024-03-04

## 2024-03-04 PROBLEM — R25.2 MUSCLE CRAMPS: Status: ACTIVE | Noted: 2024-03-04

## 2024-03-04 PROBLEM — N40.1 BPH WITH URINARY OBSTRUCTION: Status: ACTIVE | Noted: 2024-03-04

## 2024-03-04 PROBLEM — N13.8 BPH WITH URINARY OBSTRUCTION: Status: ACTIVE | Noted: 2024-03-04

## 2024-03-04 PROBLEM — E78.5 HYPERLIPIDEMIA: Status: ACTIVE | Noted: 2020-01-20

## 2024-03-04 PROBLEM — N42.9 PROSTATE DISORDER: Status: ACTIVE | Noted: 2024-03-04

## 2024-03-04 PROBLEM — R97.20 ELEVATED PROSTATE SPECIFIC ANTIGEN (PSA): Status: ACTIVE | Noted: 2024-03-04

## 2024-03-04 LAB
ALBUMIN SERPL-MCNC: 3.9 G/DL (ref 3.5–5.2)
ALBUMIN/GLOB SERPL: 1.3 G/DL
ALP SERPL-CCNC: 67 U/L (ref 39–117)
ALT SERPL W P-5'-P-CCNC: 15 U/L (ref 1–41)
ANION GAP SERPL CALCULATED.3IONS-SCNC: 9.8 MMOL/L (ref 5–15)
AST SERPL-CCNC: 22 U/L (ref 1–40)
BASOPHILS # BLD AUTO: 0.03 10*3/MM3 (ref 0–0.2)
BASOPHILS NFR BLD AUTO: 0.4 % (ref 0–1.5)
BH CV LOW VAS LEFT COMMON FEMORAL SPONT: 1
BH CV LOW VAS LEFT DISTAL FEMORAL SPONT: 1
BH CV LOW VAS LEFT EXTERNAL ILIAC AUGMENT: NORMAL
BH CV LOW VAS LEFT EXTERNAL ILIAC SPONT: 1
BH CV LOW VAS LEFT GASTRONEMIUS VESSEL: 1
BH CV LOW VAS LEFT MID FEMORAL SPONT: 1
BH CV LOW VAS LEFT PERONEAL VESSEL: 1
BH CV LOW VAS LEFT POPLITEAL SPONT: 1
BH CV LOW VAS LEFT PROFUNDA FEMORAL SPONT: 1
BH CV LOW VAS LEFT PROXIMAL FEMORAL SPONT: 1
BH CV LOWER VASCULAR LEFT COMMON FEMORAL AUGMENT: NORMAL
BH CV LOWER VASCULAR LEFT COMMON FEMORAL COMPETENT: NORMAL
BH CV LOWER VASCULAR LEFT COMMON FEMORAL COMPRESS: NORMAL
BH CV LOWER VASCULAR LEFT COMMON FEMORAL PHASIC: NORMAL
BH CV LOWER VASCULAR LEFT COMMON FEMORAL SPONT: NORMAL
BH CV LOWER VASCULAR LEFT DISTAL FEMORAL AUGMENT: NORMAL
BH CV LOWER VASCULAR LEFT DISTAL FEMORAL COMPETENT: NORMAL
BH CV LOWER VASCULAR LEFT DISTAL FEMORAL COMPRESS: NORMAL
BH CV LOWER VASCULAR LEFT DISTAL FEMORAL PHASIC: NORMAL
BH CV LOWER VASCULAR LEFT DISTAL FEMORAL SPONT: NORMAL
BH CV LOWER VASCULAR LEFT EXTERNAL ILIAC PHASIC: NORMAL
BH CV LOWER VASCULAR LEFT EXTERNAL ILIAC SPONT: NORMAL
BH CV LOWER VASCULAR LEFT GASTRONEMIUS COMPRESS: NORMAL
BH CV LOWER VASCULAR LEFT GREATER SAPH AK AUGMENT: NORMAL
BH CV LOWER VASCULAR LEFT GREATER SAPH AK COMPETENT: NORMAL
BH CV LOWER VASCULAR LEFT GREATER SAPH AK COMPRESS: NORMAL
BH CV LOWER VASCULAR LEFT GREATER SAPH AK PHASIC: NORMAL
BH CV LOWER VASCULAR LEFT GREATER SAPH AK SPONT: NORMAL
BH CV LOWER VASCULAR LEFT GREATER SAPH BK COMPRESS: NORMAL
BH CV LOWER VASCULAR LEFT LESSER SAPH COMPRESS: NORMAL
BH CV LOWER VASCULAR LEFT MID FEMORAL AUGMENT: NORMAL
BH CV LOWER VASCULAR LEFT MID FEMORAL COMPETENT: NORMAL
BH CV LOWER VASCULAR LEFT MID FEMORAL COMPRESS: NORMAL
BH CV LOWER VASCULAR LEFT MID FEMORAL PHASIC: NORMAL
BH CV LOWER VASCULAR LEFT MID FEMORAL SPONT: NORMAL
BH CV LOWER VASCULAR LEFT PERONEAL COMPRESS: NORMAL
BH CV LOWER VASCULAR LEFT POPLITEAL AUGMENT: NORMAL
BH CV LOWER VASCULAR LEFT POPLITEAL COMPETENT: NORMAL
BH CV LOWER VASCULAR LEFT POPLITEAL COMPRESS: NORMAL
BH CV LOWER VASCULAR LEFT POPLITEAL PHASIC: NORMAL
BH CV LOWER VASCULAR LEFT POPLITEAL SPONT: NORMAL
BH CV LOWER VASCULAR LEFT POSTERIOR TIBIAL AUGMENT: NORMAL
BH CV LOWER VASCULAR LEFT POSTERIOR TIBIAL COMPETENT: NORMAL
BH CV LOWER VASCULAR LEFT POSTERIOR TIBIAL COMPRESS: NORMAL
BH CV LOWER VASCULAR LEFT POSTERIOR TIBIAL PHASIC: NORMAL
BH CV LOWER VASCULAR LEFT POSTERIOR TIBIAL SPONT: NORMAL
BH CV LOWER VASCULAR LEFT PROFUNDA FEMORAL COMPRESS: NORMAL
BH CV LOWER VASCULAR LEFT PROXIMAL FEMORAL AUGMENT: NORMAL
BH CV LOWER VASCULAR LEFT PROXIMAL FEMORAL COMPETENT: NORMAL
BH CV LOWER VASCULAR LEFT PROXIMAL FEMORAL COMPRESS: NORMAL
BH CV LOWER VASCULAR LEFT PROXIMAL FEMORAL PHASIC: NORMAL
BH CV LOWER VASCULAR LEFT PROXIMAL FEMORAL SPONT: NORMAL
BH CV LOWER VASCULAR RIGHT COMMON FEMORAL AUGMENT: NORMAL
BH CV LOWER VASCULAR RIGHT COMMON FEMORAL COMPETENT: NORMAL
BH CV LOWER VASCULAR RIGHT COMMON FEMORAL COMPRESS: NORMAL
BH CV LOWER VASCULAR RIGHT COMMON FEMORAL PHASIC: NORMAL
BH CV LOWER VASCULAR RIGHT COMMON FEMORAL SPONT: NORMAL
BH CV LOWER VASCULAR RIGHT DISTAL FEMORAL COMPRESS: NORMAL
BH CV LOWER VASCULAR RIGHT EXTERNAL ILIAC AUGMENT: NORMAL
BH CV LOWER VASCULAR RIGHT EXTERNAL ILIAC PHASIC: NORMAL
BH CV LOWER VASCULAR RIGHT EXTERNAL ILIAC SPONT: NORMAL
BH CV LOWER VASCULAR RIGHT GASTRONEMIUS COMPRESS: NORMAL
BH CV LOWER VASCULAR RIGHT GREATER SAPH AK AUGMENT: NORMAL
BH CV LOWER VASCULAR RIGHT GREATER SAPH AK COMPETENT: NORMAL
BH CV LOWER VASCULAR RIGHT GREATER SAPH AK COMPRESS: NORMAL
BH CV LOWER VASCULAR RIGHT GREATER SAPH AK PHASIC: NORMAL
BH CV LOWER VASCULAR RIGHT GREATER SAPH AK SPONT: NORMAL
BH CV LOWER VASCULAR RIGHT GREATER SAPH BK COMPRESS: NORMAL
BH CV LOWER VASCULAR RIGHT LESSER SAPH COMPRESS: NORMAL
BH CV LOWER VASCULAR RIGHT MID FEMORAL AUGMENT: NORMAL
BH CV LOWER VASCULAR RIGHT MID FEMORAL COMPETENT: NORMAL
BH CV LOWER VASCULAR RIGHT MID FEMORAL COMPRESS: NORMAL
BH CV LOWER VASCULAR RIGHT MID FEMORAL PHASIC: NORMAL
BH CV LOWER VASCULAR RIGHT MID FEMORAL SPONT: NORMAL
BH CV LOWER VASCULAR RIGHT PERONEAL AUGMENT: NORMAL
BH CV LOWER VASCULAR RIGHT PERONEAL COMPETENT: NORMAL
BH CV LOWER VASCULAR RIGHT PERONEAL COMPRESS: NORMAL
BH CV LOWER VASCULAR RIGHT PERONEAL PHASIC: NORMAL
BH CV LOWER VASCULAR RIGHT PERONEAL SPONT: NORMAL
BH CV LOWER VASCULAR RIGHT POPLITEAL AUGMENT: NORMAL
BH CV LOWER VASCULAR RIGHT POPLITEAL COMPETENT: NORMAL
BH CV LOWER VASCULAR RIGHT POPLITEAL COMPRESS: NORMAL
BH CV LOWER VASCULAR RIGHT POPLITEAL PHASIC: NORMAL
BH CV LOWER VASCULAR RIGHT POPLITEAL SPONT: NORMAL
BH CV LOWER VASCULAR RIGHT POSTERIOR TIBIAL AUGMENT: NORMAL
BH CV LOWER VASCULAR RIGHT POSTERIOR TIBIAL COMPETENT: NORMAL
BH CV LOWER VASCULAR RIGHT POSTERIOR TIBIAL COMPRESS: NORMAL
BH CV LOWER VASCULAR RIGHT POSTERIOR TIBIAL PHASIC: NORMAL
BH CV LOWER VASCULAR RIGHT POSTERIOR TIBIAL SPONT: NORMAL
BH CV LOWER VASCULAR RIGHT PROXIMAL FEMORAL COMPRESS: NORMAL
BH CV VAS PRELIMINARY FINDINGS SCRIPTING: 1
BILIRUB SERPL-MCNC: 1.4 MG/DL (ref 0–1.2)
BUN SERPL-MCNC: 14 MG/DL (ref 8–23)
BUN/CREAT SERPL: 13 (ref 7–25)
CALCIUM SPEC-SCNC: 8.8 MG/DL (ref 8.2–9.6)
CHLORIDE SERPL-SCNC: 102 MMOL/L (ref 98–107)
CO2 SERPL-SCNC: 25.2 MMOL/L (ref 22–29)
CREAT SERPL-MCNC: 1.08 MG/DL (ref 0.76–1.27)
DEPRECATED RDW RBC AUTO: 41.1 FL (ref 37–54)
EGFRCR SERPLBLD CKD-EPI 2021: 64.8 ML/MIN/1.73
EOSINOPHIL # BLD AUTO: 0.24 10*3/MM3 (ref 0–0.4)
EOSINOPHIL NFR BLD AUTO: 3.2 % (ref 0.3–6.2)
ERYTHROCYTE [DISTWIDTH] IN BLOOD BY AUTOMATED COUNT: 12.5 % (ref 12.3–15.4)
GLOBULIN UR ELPH-MCNC: 2.9 GM/DL
GLUCOSE SERPL-MCNC: 139 MG/DL (ref 65–99)
HCT VFR BLD AUTO: 37.6 % (ref 37.5–51)
HGB BLD-MCNC: 12.1 G/DL (ref 13–17.7)
HOLD SPECIMEN: NORMAL
HOLD SPECIMEN: NORMAL
IMM GRANULOCYTES # BLD AUTO: 0.03 10*3/MM3 (ref 0–0.05)
IMM GRANULOCYTES NFR BLD AUTO: 0.4 % (ref 0–0.5)
INR PPP: 1.13 (ref 0.86–1.15)
LYMPHOCYTES # BLD AUTO: 0.87 10*3/MM3 (ref 0.7–3.1)
LYMPHOCYTES NFR BLD AUTO: 11.6 % (ref 19.6–45.3)
MCH RBC QN AUTO: 29.1 PG (ref 26.6–33)
MCHC RBC AUTO-ENTMCNC: 32.2 G/DL (ref 31.5–35.7)
MCV RBC AUTO: 90.4 FL (ref 79–97)
MONOCYTES # BLD AUTO: 0.72 10*3/MM3 (ref 0.1–0.9)
MONOCYTES NFR BLD AUTO: 9.6 % (ref 5–12)
NEUTROPHILS NFR BLD AUTO: 5.61 10*3/MM3 (ref 1.7–7)
NEUTROPHILS NFR BLD AUTO: 74.8 % (ref 42.7–76)
NRBC BLD AUTO-RTO: 0 /100 WBC (ref 0–0.2)
PLATELET # BLD AUTO: 233 10*3/MM3 (ref 140–450)
PMV BLD AUTO: 8.9 FL (ref 6–12)
POTASSIUM SERPL-SCNC: 4.4 MMOL/L (ref 3.5–5.2)
PROT SERPL-MCNC: 6.8 G/DL (ref 6–8.5)
PROTHROMBIN TIME: 14.7 SECONDS (ref 11.8–14.9)
RBC # BLD AUTO: 4.16 10*6/MM3 (ref 4.14–5.8)
SODIUM SERPL-SCNC: 137 MMOL/L (ref 136–145)
WBC NRBC COR # BLD AUTO: 7.5 10*3/MM3 (ref 3.4–10.8)
WHOLE BLOOD HOLD COAG: NORMAL
WHOLE BLOOD HOLD SPECIMEN: NORMAL

## 2024-03-04 PROCEDURE — 85025 COMPLETE CBC W/AUTO DIFF WBC: CPT

## 2024-03-04 PROCEDURE — 99284 EMERGENCY DEPT VISIT MOD MDM: CPT

## 2024-03-04 PROCEDURE — 93970 EXTREMITY STUDY: CPT

## 2024-03-04 PROCEDURE — 85610 PROTHROMBIN TIME: CPT | Performed by: EMERGENCY MEDICINE

## 2024-03-04 PROCEDURE — 93970 EXTREMITY STUDY: CPT | Performed by: SURGERY

## 2024-03-04 PROCEDURE — 36415 COLL VENOUS BLD VENIPUNCTURE: CPT

## 2024-03-04 PROCEDURE — 80053 COMPREHEN METABOLIC PANEL: CPT | Performed by: EMERGENCY MEDICINE

## 2024-03-04 RX ADMIN — APIXABAN 10 MG: 5 TABLET, FILM COATED ORAL at 11:54

## 2024-03-04 NOTE — ED PROVIDER NOTES
Time: 11:55 AM EST  Date of encounter:  3/4/2024  Independent Historian/Clinical History and Information was obtained by:   Patient and Nursing Staff    History is limited by: N/A    Chief Complaint: Left leg swelling      History of Present Illness:  Patient is a 89 y.o. year old male with no history of DVT or PE who presents to the emergency department for evaluation of left leg swelling for the past week.    He was essentially sent here for duplex ultrasound to rule out DVT.    He denies any chest pain or dyspnea.    No recent injury to the left leg and denies any current cancer.    He is not bothered by the leg but notes that his left leg is clearly more swollen than the right leg.    HPI    Patient Care Team  Primary Care Provider: Bal Doll MD    Past Medical History:     Allergies   Allergen Reactions    Tamsulosin Confusion     Past Medical History:   Diagnosis Date    Arthritis     Asthma     BPH (benign prostatic hyperplasia) 09/05/2014    BPH with urinary obstruction     Diabetes mellitus type 1, controlled, insulin dependent     Elevated prostate specific antigen (PSA)     High cholesterol     Hyperlipemia     Hyperlipidemia     Muscle cramp     Prostate disorder     Seasonal allergies     Shortness of breath     Skin cancer     Trochanteric bursitis, left hip 8/28/2017, 9/21/2017     Past Surgical History:   Procedure Laterality Date    HAND SURGERY      HERNIA REPAIR      x3    KNEE SURGERY Bilateral     OTHER SURGICAL HISTORY      Join Surgery    PROSTATE BIOPSY  1993    SKIN CANCER EXCISION      TONSILLECTOMY      WRIST SURGERY       Family History   Problem Relation Age of Onset    Heart disease Mother     Stroke Father     Heart disease Father     Arthritis Father         family histoy of certain chronic disabling diseases    Osteoporosis Father     Arthritis Sister         family histoy of certain chronic disabling diseases    Stroke Brother     Arthritis Brother         family histoy of  certain chronic disabling diseases    Heart disease Daughter     Cancer Daughter         unspecified    Diabetes Daughter         unspecified type    Arthritis Daughter         family histoy of certain chronic disabling diseases    Heart disease Son     Diabetes Son         unspecified type    Arthritis Son         family histoy of certain chronic disabling diseases       Home Medications:  Prior to Admission medications    Medication Sig Start Date End Date Taking? Authorizing Provider   albuterol sulfate  (90 Base) MCG/ACT inhaler albuterol sulfate HFA 90 mcg/actuation aerosol inhaler    ProviderAnali MD   Apixaban Starter Pack tablet therapy pack Take two 5 mg tablets by mouth every 12 hours for 7 days. Followed by one 5 mg tablet every 12 hours. (Dispense starter pack if available) 3/4/24   Jt Webber MD   cetirizine (zyrTEC) 10 MG tablet cetirizine 10 mg oral tablet take 1 tablet (10 mg) by oral route once daily   Active    Anali Macias MD   Diclofenac Sodium (Voltaren Arthritis Pain) 1 % gel gel Apply 2 g topically to the appropriate area as directed 3 (Three) Times a Day. 1/25/24   Antwan Barker DO   ferrous sulfate 325 (65 FE) MG tablet ferrous sulfate 325 mg (65 mg iron) oral tablet take 1 tablet (325 mg) by oral route once daily   Active    ProviderAnali MD   finasteride (PROSCAR) 5 MG tablet finasteride 5 mg oral tablet take 1 tablet (5 mg) by oral route once daily   Suspended    Anali Macias MD   fluticasone (Flovent HFA) 110 MCG/ACT inhaler Flovent  mcg/actuation inhalation HFA aerosol inhaler inhale 2 puffs (220 mcg) by inhalation route 2 times per day   Active    Anali Macias MD   montelukast (SINGULAIR) 10 MG tablet montelukast 10 mg oral tablet take 1 tablet (10 mg) by oral route once daily in the evening   Active    Anali Macias MD   simvastatin (ZOCOR) 40 MG tablet simvastatin 40 mg oral tablet take 1 tablet (40 mg) by  "oral route once daily in the evening   Suspended    ProviderAnali MD   simvastatin (ZOCOR) 10 MG tablet Take 1 tablet by mouth Daily.  3/4/24  ProviderAnali MD        Social History:   Social History     Tobacco Use    Smoking status: Never    Smokeless tobacco: Never   Vaping Use    Vaping status: Never Used   Substance Use Topics    Alcohol use: Never    Drug use: Never         Review of Systems:  Review of Systems   I performed a 10 point review of systems which was all negative, except for the positives found in the HPI above.  Physical Exam:  /71   Pulse 76   Temp 98.3 °F (36.8 °C) (Oral)   Resp 20   Ht 177.8 cm (70\")   Wt 78.1 kg (172 lb 2.9 oz)   SpO2 99%   BMI 24.71 kg/m²     Physical Exam   General: Awake alert and in no obvious distress    HEENT: Head normocephalic atraumatic, eyes PERRLA EOMI, nose normal, oropharynx normal.    Neck: Supple full range of motion, no meningismus, no lymphadenopathy    Heart: Regular rate and rhythm, no murmurs or rubs, 2+ radial pulses bilaterally    Lungs: Clear to auscultation bilaterally without wheezes or crackles, no respiratory distress    Abdomen: Soft, nontender, nondistended, no rebound or guarding    Skin: Warm, dry, no rash    Musculoskeletal: Normal range of motion, 2+ pitting left lower extremity edema but no actual calf tenderness and no discoloration, normal sensation and pulses    Neurologic: Oriented x3, no motor deficits no sensory deficits    Psychiatric: Mood appears stable, no psychosis          Procedures:  Procedures      Medical Decision Making:      Comorbidities that affect care:    Advanced age    External Notes reviewed:    Previous Labs: I reviewed his baseline lab work showing essentially normal renal function with GFR greater than 60.      The following orders were placed and all results were independently analyzed by me:  Orders Placed This Encounter   Procedures    [facility] Draw    Comprehensive Metabolic Panel    " CBC Auto Differential    Protime-INR    NPO Diet NPO Type: Strict NPO    Undress & Gown    CBC & Differential    Green Top (Gel)    Lavender Top    Gold Top - SST    Light Blue Top       Medications Given in the Emergency Department:  Medications   apixaban (ELIQUIS) tablet 10 mg (10 mg Oral Given 3/4/24 1154)        ED Course:         Labs:    Lab Results (last 24 hours)       Procedure Component Value Units Date/Time    CBC & Differential [[id number]]  (Abnormal) Collected: 03/04/24 1021    Specimen: Blood Updated: 03/04/24 1037    Narrative:      The following orders were created for panel order CBC & Differential.  Procedure                               Abnormality         Status                     ---------                               -----------         ------                     CBC Auto Differential[[id number]]        Abnormal            Final result                 Please view results for these tests on the individual orders.    Comprehensive Metabolic Panel [[id number]]  (Abnormal) Collected: 03/04/24 1021    Specimen: Blood Updated: 03/04/24 1101     Glucose 139 mg/dL      BUN 14 mg/dL      Creatinine 1.08 mg/dL      Sodium 137 mmol/L      Potassium 4.4 mmol/L      Chloride 102 mmol/L      CO2 25.2 mmol/L      Calcium 8.8 mg/dL      Total Protein 6.8 g/dL      Albumin 3.9 g/dL      ALT (SGPT) 15 U/L      AST (SGOT) 22 U/L      Alkaline Phosphatase 67 U/L      Total Bilirubin 1.4 mg/dL      Globulin 2.9 gm/dL      A/G Ratio 1.3 g/dL      BUN/Creatinine Ratio 13.0     Anion Gap 9.8 mmol/L      eGFR 64.8 mL/min/1.73     Narrative:      GFR Normal >60  Chronic Kidney Disease <60  Kidney Failure <15    The GFR formula is only valid for adults with stable renal function between ages 18 and 70.    CBC Auto Differential [[id number]]  (Abnormal) Collected: 03/04/24 1021    Specimen: Blood Updated: 03/04/24 1037     WBC 7.50 10*3/mm3      RBC 4.16 10*6/mm3      Hemoglobin 12.1 g/dL      Hematocrit 37.6 %      MCV  90.4 fL      MCH 29.1 pg      MCHC 32.2 g/dL      RDW 12.5 %      RDW-SD 41.1 fl      MPV 8.9 fL      Platelets 233 10*3/mm3      Neutrophil % 74.8 %      Lymphocyte % 11.6 %      Monocyte % 9.6 %      Eosinophil % 3.2 %      Basophil % 0.4 %      Immature Grans % 0.4 %      Neutrophils, Absolute 5.61 10*3/mm3      Lymphocytes, Absolute 0.87 10*3/mm3      Monocytes, Absolute 0.72 10*3/mm3      Eosinophils, Absolute 0.24 10*3/mm3      Basophils, Absolute 0.03 10*3/mm3      Immature Grans, Absolute 0.03 10*3/mm3      nRBC 0.0 /100 WBC     Protime-INR [[id number]]  (Normal) Collected: 03/04/24 1021    Specimen: Blood Updated: 03/04/24 1145     Protime 14.7 Seconds      INR 1.13    Narrative:      Suggested Therapeutic Ranges For Oral Anticoagulant Therapy:  Level of Therapy                      INR Target Range  Standard Dose                            2.0-3.0  High Dose                                2.5-3.5  Patients not receiving anticoagulant  Therapy Normal Range                     0.86-1.15             Imaging:    No Radiology Exams Resulted Within Past 24 Hours      Differential Diagnosis and Discussion:    Extremity Pain: Differential diagnosis includes but is not limited to soft tissue sprain, tendonitis, tendon injury, dislocation, fracture, deep vein thrombosis, arterial insufficiency, osteoarthritis, bursitis, and ligamentous damage.    All labs were reviewed and interpreted by me.  Ultrasound impression was interpreted by me.     MDM     Amount and/or Complexity of Data Reviewed  Clinical lab tests: reviewed           This patient is a 89-year-old male with nontraumatic left lower extremity swelling this week.    Duplex ultrasound positive for left lower extremity DVT from left calf all the way up to the left iliac.    I do not see any signs of acute emergencies such as cerulea phlegmasia dolens.    He is not having any chest pain or dyspnea and oxygenating 99% on room air in no distress.    I think he be  managed as an outpatient has a normal renal function and no GI bleeding and I will start him on Eliquis starter pack.    I will have him follow-up with his primary care physician ASAP for further workup for hyper -coagulable state.                Patient Care Considerations:          Consultants/Shared Management Plan:        Social Determinants of Health:    Patient is independent, reliable, and has access to care.       Disposition and Care Coordination:    Discharged: The patient is suitable and stable for discharge with no need for consideration of admission.    I have explained the patient´s condition, diagnoses and treatment plan based on the information available to me at this time. I have answered questions and addressed any concerns. The patient has a good  understanding of the patient´s diagnosis, condition, and treatment plan as can be expected at this point. The vital signs have been stable. The patient´s condition is stable and appropriate for discharge from the emergency department.      The patient will pursue further outpatient evaluation with the primary care physician or other designated or consulting physician as outlined in the discharge instructions. They are agreeable to this plan of care and follow-up instructions have been explained in detail. The patient has received these instructions in written format and has expressed an understanding of the discharge instructions. The patient is aware that any significant change in condition or worsening of symptoms should prompt an immediate return to this or the closest emergency department or call to 911.  I have explained discharge medications and the need for follow up with the patient/caretakers. This was also printed in the discharge instructions. Patient was discharged with the following medications and follow up:      Medication List        New Prescriptions      Apixaban Starter Pack tablet therapy pack  Take two 5 mg tablets by mouth every 12  hours for 7 days. Followed by one 5 mg tablet every 12 hours. (Dispense starter pack if available)               Where to Get Your Medications        These medications were sent to [facility] [id number] - [place], [place] - [address] [facility] [address] - [phone]  - [phone] FX  [address] [place] [address] [place] [address]      Phone: [phone]   Apixaban Starter Pack tablet therapy pack      Bal Doll MD  [address]  [address]  [place] [address]  [phone]    Call in 1 day  for a follow-up appointment for your left leg DVT       Final diagnoses:   Acute deep vein thrombosis (DVT) of iliac vein of left lower extremity        ED Disposition       ED Disposition   Discharge    Condition   Stable    Comment   --               This medical record created using voice recognition software.             Jt Webber MD  03/04/24 2317

## 2024-03-04 NOTE — SIGNIFICANT NOTE
03/04/24 1152   Plan   Plan Comments Eloquis free 30 day trial offer card was provided to patient per provider request.

## 2024-03-04 NOTE — DISCHARGE INSTRUCTIONS
Unfortunately, your ultrasound did show large blood clot extending from behind your left calf all the way up to the left groin (left iliac vein) for which you should try to keep your leg elevated when possible and please use the blood thinning pill Eliquis (apixaban) twice a day for the next 3 months.    The first week you will be taking a higher dose than normal as a loading dose.    Please call your primary care physician for further evaluation and workup of this blood clot.